# Patient Record
Sex: MALE | Race: OTHER | Employment: FULL TIME | URBAN - METROPOLITAN AREA
[De-identification: names, ages, dates, MRNs, and addresses within clinical notes are randomized per-mention and may not be internally consistent; named-entity substitution may affect disease eponyms.]

---

## 2017-11-09 ENCOUNTER — HOSPITAL ENCOUNTER (OUTPATIENT)
Age: 27
Discharge: HOME | End: 2017-11-09
Attending: FAMILY MEDICINE

## 2017-11-09 VITALS
SYSTOLIC BLOOD PRESSURE: 124 MMHG | BODY MASS INDEX: 24.25 KG/M2 | DIASTOLIC BLOOD PRESSURE: 78 MMHG | HEIGHT: 68 IN | HEART RATE: 92 BPM | OXYGEN SATURATION: 100 % | RESPIRATION RATE: 16 BRPM | TEMPERATURE: 99 F | WEIGHT: 160 LBS

## 2017-11-09 DIAGNOSIS — N47.6 BALANOPOSTHITIS: Primary | ICD-10-CM

## 2017-11-09 PROCEDURE — 87591 N.GONORRHOEAE DNA AMP PROB: CPT | Performed by: FAMILY MEDICINE

## 2017-11-09 PROCEDURE — 87491 CHLMYD TRACH DNA AMP PROBE: CPT | Performed by: FAMILY MEDICINE

## 2017-11-09 PROCEDURE — 99204 OFFICE O/P NEW MOD 45 MIN: CPT

## 2017-11-09 PROCEDURE — 99203 OFFICE O/P NEW LOW 30 MIN: CPT

## 2017-11-09 RX ORDER — NYSTATIN 100000 U/G
1 CREAM TOPICAL 2 TIMES DAILY
Qty: 15 G | Refills: 0 | Status: SHIPPED | OUTPATIENT
Start: 2017-11-09 | End: 2017-11-23

## 2017-11-09 NOTE — ED INITIAL ASSESSMENT (HPI)
Pt c/o \"smells\" when pulling back foreskin x 1 week . Onset  Says after having intercourse with partner who is pregnant and is having some white vaginal discharge. Denies any burning on urination, denies any discharge, redness, itching or other concerns.

## 2017-11-09 NOTE — ED PROVIDER NOTES
Patient Seen in: [facility]    History   Patient presents with:  Eval-G (gynecologic)    Stated Complaint: Infection x 1 week    HPI    Suzi Anthony. is a 26-year-old male who presents with complains of possible Normal testicles bilaterally, no tenderness. Abdomen is soft, nontender.     ED Course     Labs Reviewed   CHLAMYDIA/GONOCOCCUS, MICAH       ED Course as of Nov 09 1310  ------------------------------------------------------------       MDM       Differentia

## 2018-08-16 PROBLEM — N47.1 REDUNDANT PREPUCE AND PHIMOSIS: Status: ACTIVE | Noted: 2018-08-16

## 2018-08-16 PROBLEM — N47.8 REDUNDANT PREPUCE AND PHIMOSIS: Status: ACTIVE | Noted: 2018-08-16

## 2018-08-16 PROBLEM — N48.1 BALANITIS: Status: ACTIVE | Noted: 2018-08-16

## 2019-01-19 ENCOUNTER — OFFICE VISIT (OUTPATIENT)
Dept: FAMILY MEDICINE CLINIC | Facility: CLINIC | Age: 29
End: 2019-01-19
Payer: COMMERCIAL

## 2019-01-19 VITALS
RESPIRATION RATE: 18 BRPM | TEMPERATURE: 99 F | BODY MASS INDEX: 25 KG/M2 | HEART RATE: 101 BPM | SYSTOLIC BLOOD PRESSURE: 100 MMHG | DIASTOLIC BLOOD PRESSURE: 68 MMHG | OXYGEN SATURATION: 97 % | WEIGHT: 164.38 LBS

## 2019-01-19 DIAGNOSIS — R05.9 COUGH: ICD-10-CM

## 2019-01-19 DIAGNOSIS — J00 NASOPHARYNGITIS: Primary | ICD-10-CM

## 2019-01-19 LAB
CONTROL LINE PRESENT WITH A CLEAR BACKGROUND (YES/NO): YES YES/NO
STREP GRP A CUL-SCR: NEGATIVE

## 2019-01-19 PROCEDURE — 99202 OFFICE O/P NEW SF 15 MIN: CPT | Performed by: NURSE PRACTITIONER

## 2019-01-19 PROCEDURE — 87880 STREP A ASSAY W/OPTIC: CPT | Performed by: NURSE PRACTITIONER

## 2019-01-19 RX ORDER — ALBUTEROL SULFATE 90 UG/1
2 AEROSOL, METERED RESPIRATORY (INHALATION) EVERY 6 HOURS PRN
Qty: 1 INHALER | Refills: 0 | Status: SHIPPED | OUTPATIENT
Start: 2019-01-19 | End: 2019-01-26

## 2019-01-19 NOTE — PROGRESS NOTES
CHIEF COMPLAINT:   Patient presents with:  Sinus Problem: chest congestion, face pressure, RT ear pressure, cough, sore throat, post nasal drip, x1 day       HPI:   Josep Art. is a 29year old male who presents for upper respiratory symptoms NOSE: Nostrils patent, minimal clear nasal discharge, nasal mucosa mildly inflamed and edematous  THROAT: Oral mucosa pink, moist. Posterior pharynx is without erythema. negative exudates.  No pus pockets  NECK: Supple, non-tender  LUNGS: clear to auscultat You have a viral upper respiratory illness (URI), which is another term for the common cold. This illness is contagious during the first few days. It is spread through the air by coughing and sneezing.  It may also be spread by direct contact (touching the · Cough with lots of colored sputum (mucus)  · Severe headache; face, neck, or ear pain  · Difficulty swallowing due to throat pain  · Fever of 100.4°F (38°C) or higher, or as directed by your healthcare provider  Call 911  Call 911 if any of these occur:

## 2019-10-04 ENCOUNTER — OFFICE VISIT (OUTPATIENT)
Dept: FAMILY MEDICINE CLINIC | Facility: CLINIC | Age: 29
End: 2019-10-04
Payer: COMMERCIAL

## 2019-10-04 VITALS
BODY MASS INDEX: 23 KG/M2 | DIASTOLIC BLOOD PRESSURE: 64 MMHG | OXYGEN SATURATION: 99 % | WEIGHT: 150 LBS | TEMPERATURE: 99 F | SYSTOLIC BLOOD PRESSURE: 117 MMHG | HEART RATE: 87 BPM

## 2019-10-04 DIAGNOSIS — R68.89 FLU-LIKE SYMPTOMS: Primary | ICD-10-CM

## 2019-10-04 DIAGNOSIS — B34.9 VIRAL SYNDROME: ICD-10-CM

## 2019-10-04 PROCEDURE — 87502 INFLUENZA DNA AMP PROBE: CPT | Performed by: NURSE PRACTITIONER

## 2019-10-04 PROCEDURE — 99213 OFFICE O/P EST LOW 20 MIN: CPT | Performed by: NURSE PRACTITIONER

## 2019-10-04 NOTE — PATIENT INSTRUCTIONS
-stay well hydrated and rest  -ibuprofen and tylenol as needed for fever  -use humidifier overnight  -warm steam showers  -follow up if new or worsening symptoms, or if no improvement in next 3-4 days            Viral Syndrome (Adult)  A viral illness may apple, grape, and cranberry juice; clear fruit drinks; electrolyte replacement and sports drinks; and decaffeinated teas and coffee.  If you have been diagnosed with a kidney disease, ask your healthcare provider how much and what types of fluids you should

## 2019-10-04 NOTE — PROGRESS NOTES
[facility] CHIEF COMPLAINT:   Patient presents with: Body ache and/or chills: and congestion x3 days      HPI:   Benjamin Fay. is a 34year old male who presents for sudden onset of flu-like symptoms. Symptoms began 3 days ago.   Patient reports body ac LUNGS: clear to auscultation bilaterally, no wheezes or rhonchi. Breathing is non labored. CARDIO: RRR without murmur  LYMPH:  no lymphadenopathy.       Recent Results (from the past 24 hour(s))   INFLUENZA DNA AMP PROBE    Collection Time: 10/04/19  1:17 A viral illness usually lasts anywhere from several days to several weeks, but sometimes it lasts longer. In some cases, a more serious infection can look like a viral syndrome in the first few days of the illness.  You may need another exam and additional Call 911 if any of the following occur:  · Convulsion  · Feeling weak, dizzy, or like you are going to faint  · Chest pain, or more than mild shortness of breath  When to seek medical advice  Call your healthcare provider right away if any of these occur:

## 2020-01-23 ENCOUNTER — LAB ENCOUNTER (OUTPATIENT)
Dept: LAB | Age: 30
End: 2020-01-23
Attending: INTERNAL MEDICINE
Payer: COMMERCIAL

## 2020-01-23 ENCOUNTER — OFFICE VISIT (OUTPATIENT)
Dept: INTERNAL MEDICINE CLINIC | Facility: CLINIC | Age: 30
End: 2020-01-23
Payer: COMMERCIAL

## 2020-01-23 VITALS
RESPIRATION RATE: 16 BRPM | WEIGHT: 150 LBS | SYSTOLIC BLOOD PRESSURE: 128 MMHG | DIASTOLIC BLOOD PRESSURE: 70 MMHG | HEIGHT: 67.5 IN | OXYGEN SATURATION: 98 % | HEART RATE: 64 BPM | BODY MASS INDEX: 23.27 KG/M2 | TEMPERATURE: 98 F

## 2020-01-23 DIAGNOSIS — Z00.00 LABORATORY EXAMINATION ORDERED AS PART OF A ROUTINE GENERAL MEDICAL EXAMINATION: ICD-10-CM

## 2020-01-23 DIAGNOSIS — Z28.21 INFLUENZA VACCINATION DECLINED BY PATIENT: ICD-10-CM

## 2020-01-23 DIAGNOSIS — Z13.0 SCREENING, IRON DEFICIENCY ANEMIA: ICD-10-CM

## 2020-01-23 DIAGNOSIS — Z13.29 THYROID DISORDER SCREEN: ICD-10-CM

## 2020-01-23 DIAGNOSIS — Z13.220 LIPID SCREENING: ICD-10-CM

## 2020-01-23 DIAGNOSIS — Z11.3 ROUTINE SCREENING FOR STI (SEXUALLY TRANSMITTED INFECTION): ICD-10-CM

## 2020-01-23 DIAGNOSIS — Z00.00 ANNUAL PHYSICAL EXAM: Primary | ICD-10-CM

## 2020-01-23 DIAGNOSIS — Z13.89 SCREENING FOR GENITOURINARY CONDITION: ICD-10-CM

## 2020-01-23 PROBLEM — N48.1 BALANITIS: Status: RESOLVED | Noted: 2018-08-16 | Resolved: 2020-01-23

## 2020-01-23 PROBLEM — N47.1 REDUNDANT PREPUCE AND PHIMOSIS: Status: RESOLVED | Noted: 2018-08-16 | Resolved: 2020-01-23

## 2020-01-23 PROBLEM — N47.8 REDUNDANT PREPUCE AND PHIMOSIS: Status: RESOLVED | Noted: 2018-08-16 | Resolved: 2020-01-23

## 2020-01-23 LAB
ALBUMIN SERPL-MCNC: 4.2 G/DL (ref 3.4–5)
ALBUMIN/GLOB SERPL: 1.2 {RATIO} (ref 1–2)
ALP LIVER SERPL-CCNC: 64 U/L (ref 45–117)
ALT SERPL-CCNC: 28 U/L (ref 16–61)
ANION GAP SERPL CALC-SCNC: 4 MMOL/L (ref 0–18)
AST SERPL-CCNC: 21 U/L (ref 15–37)
BASOPHILS # BLD AUTO: 0.05 X10(3) UL (ref 0–0.2)
BASOPHILS NFR BLD AUTO: 1 %
BILIRUB SERPL-MCNC: 1.4 MG/DL (ref 0.1–2)
BILIRUB UR QL STRIP.AUTO: NEGATIVE
BUN BLD-MCNC: 18 MG/DL (ref 7–18)
BUN/CREAT SERPL: 18.8 (ref 10–20)
CALCIUM BLD-MCNC: 9.5 MG/DL (ref 8.5–10.1)
CHLORIDE SERPL-SCNC: 106 MMOL/L (ref 98–112)
CHOLEST SMN-MCNC: 133 MG/DL (ref ?–200)
CO2 SERPL-SCNC: 29 MMOL/L (ref 21–32)
COLOR UR AUTO: YELLOW
CREAT BLD-MCNC: 0.96 MG/DL (ref 0.7–1.3)
DEPRECATED RDW RBC AUTO: 40.7 FL (ref 35.1–46.3)
EOSINOPHIL # BLD AUTO: 0.11 X10(3) UL (ref 0–0.7)
EOSINOPHIL NFR BLD AUTO: 2.2 %
ERYTHROCYTE [DISTWIDTH] IN BLOOD BY AUTOMATED COUNT: 11.4 % (ref 11–15)
EST. AVERAGE GLUCOSE BLD GHB EST-MCNC: 88 MG/DL (ref 68–126)
GLOBULIN PLAS-MCNC: 3.6 G/DL (ref 2.8–4.4)
GLUCOSE BLD-MCNC: 84 MG/DL (ref 70–99)
GLUCOSE UR STRIP.AUTO-MCNC: NEGATIVE MG/DL
HBA1C MFR BLD HPLC: 4.7 % (ref ?–5.7)
HCT VFR BLD AUTO: 45 % (ref 39–53)
HDLC SERPL-MCNC: 53 MG/DL (ref 40–59)
HGB BLD-MCNC: 15.4 G/DL (ref 13–17.5)
IMM GRANULOCYTES # BLD AUTO: 0.01 X10(3) UL (ref 0–1)
IMM GRANULOCYTES NFR BLD: 0.2 %
KETONES UR STRIP.AUTO-MCNC: NEGATIVE MG/DL
LDLC SERPL CALC-MCNC: 70 MG/DL (ref ?–100)
LEUKOCYTE ESTERASE UR QL STRIP.AUTO: NEGATIVE
LYMPHOCYTES # BLD AUTO: 1.93 X10(3) UL (ref 1–4)
LYMPHOCYTES NFR BLD AUTO: 38.4 %
M PROTEIN MFR SERPL ELPH: 7.8 G/DL (ref 6.4–8.2)
MCH RBC QN AUTO: 33 PG (ref 26–34)
MCHC RBC AUTO-ENTMCNC: 34.2 G/DL (ref 31–37)
MCV RBC AUTO: 96.6 FL (ref 80–100)
MONOCYTES # BLD AUTO: 0.46 X10(3) UL (ref 0.1–1)
MONOCYTES NFR BLD AUTO: 9.1 %
NEUTROPHILS # BLD AUTO: 2.47 X10 (3) UL (ref 1.5–7.7)
NEUTROPHILS # BLD AUTO: 2.47 X10(3) UL (ref 1.5–7.7)
NEUTROPHILS NFR BLD AUTO: 49.1 %
NITRITE UR QL STRIP.AUTO: NEGATIVE
NONHDLC SERPL-MCNC: 80 MG/DL (ref ?–130)
OSMOLALITY SERPL CALC.SUM OF ELEC: 289 MOSM/KG (ref 275–295)
PATIENT FASTING Y/N/NP: YES
PATIENT FASTING Y/N/NP: YES
PH UR STRIP.AUTO: 6 [PH] (ref 4.5–8)
PLATELET # BLD AUTO: 199 10(3)UL (ref 150–450)
POTASSIUM SERPL-SCNC: 4.2 MMOL/L (ref 3.5–5.1)
PROT UR STRIP.AUTO-MCNC: NEGATIVE MG/DL
RBC # BLD AUTO: 4.66 X10(6)UL (ref 4.3–5.7)
RBC UR QL AUTO: NEGATIVE
SODIUM SERPL-SCNC: 139 MMOL/L (ref 136–145)
SP GR UR STRIP.AUTO: 1.03 (ref 1–1.03)
T PALLIDUM AB SER QL IA: NONREACTIVE
TRIGL SERPL-MCNC: 50 MG/DL (ref 30–149)
TSI SER-ACNC: 2.51 MIU/ML (ref 0.36–3.74)
UROBILINOGEN UR STRIP.AUTO-MCNC: <2 MG/DL
VLDLC SERPL CALC-MCNC: 10 MG/DL (ref 0–30)
WBC # BLD AUTO: 5 X10(3) UL (ref 4–11)

## 2020-01-23 PROCEDURE — 85025 COMPLETE CBC W/AUTO DIFF WBC: CPT

## 2020-01-23 PROCEDURE — 84443 ASSAY THYROID STIM HORMONE: CPT

## 2020-01-23 PROCEDURE — 87591 N.GONORRHOEAE DNA AMP PROB: CPT

## 2020-01-23 PROCEDURE — 87491 CHLMYD TRACH DNA AMP PROBE: CPT

## 2020-01-23 PROCEDURE — 83036 HEMOGLOBIN GLYCOSYLATED A1C: CPT

## 2020-01-23 PROCEDURE — 80053 COMPREHEN METABOLIC PANEL: CPT

## 2020-01-23 PROCEDURE — 87389 HIV-1 AG W/HIV-1&-2 AB AG IA: CPT

## 2020-01-23 PROCEDURE — 80061 LIPID PANEL: CPT

## 2020-01-23 PROCEDURE — 86780 TREPONEMA PALLIDUM: CPT

## 2020-01-23 PROCEDURE — 36415 COLL VENOUS BLD VENIPUNCTURE: CPT

## 2020-01-23 PROCEDURE — 81003 URINALYSIS AUTO W/O SCOPE: CPT

## 2020-01-23 PROCEDURE — 99385 PREV VISIT NEW AGE 18-39: CPT | Performed by: INTERNAL MEDICINE

## 2020-01-23 NOTE — PROGRESS NOTES
HPI:    Patient ID: Dilcia Walton. is a 34year old male. HPI  Dilcia Walton. is a 34year old male who presents for a complete physical exam.   HPI:   Pt complains of fatigue. Due to personal stressors. No H/S ideations.  Would like chest pain on exertion  GI: denies abdominal pain,denies heartburn  : denies nocturia or changes in stream  MUSCULOSKELETAL: denies back pain  NEURO: denies headaches  PSYCHE: denies depression, + anxiety  HEMATOLOGIC: denies hx of anemia  ENDOCRINE: den screen  Laboratory examination ordered as part of a routine general medical examination  Screening for genitourinary condition  Lipid screening  Screening, iron deficiency anemia  Routine screening for sti (sexually transmitted infection)  Influenza vaccin

## 2020-01-24 LAB
C TRACH DNA SPEC QL NAA+PROBE: NEGATIVE
N GONORRHOEA DNA SPEC QL NAA+PROBE: NEGATIVE

## 2020-11-17 PROBLEM — Z02.82 ADOPTED: Status: ACTIVE | Noted: 2020-11-17

## 2021-02-04 PROBLEM — F32.A MILD DEPRESSIVE DISORDER: Status: ACTIVE | Noted: 2021-02-04

## 2021-02-04 PROBLEM — F11.21 OPIOID DEPENDENCE IN REMISSION (HCC): Status: ACTIVE | Noted: 2021-02-04

## (undated) NOTE — LETTER
Date: 10/4/2019    Patient Name: Preeti Sesay. To Whom it may concern: The above patient was seen at the [facility] for treatment of a medical condition.     This patient should be excused from attending work 10/4/19 and